# Patient Record
Sex: FEMALE | Race: WHITE | Employment: UNEMPLOYED | URBAN - METROPOLITAN AREA
[De-identification: names, ages, dates, MRNs, and addresses within clinical notes are randomized per-mention and may not be internally consistent; named-entity substitution may affect disease eponyms.]

---

## 2017-05-26 ENCOUNTER — TELEPHONE (OUTPATIENT)
Dept: SURGERY | Age: 22
End: 2017-05-26

## 2018-09-20 ENCOUNTER — HOSPITAL ENCOUNTER (EMERGENCY)
Age: 23
Discharge: HOME | End: 2018-09-20
Attending: EMERGENCY MEDICINE
Payer: MEDICAID

## 2018-09-20 VITALS
DIASTOLIC BLOOD PRESSURE: 74 MMHG | OXYGEN SATURATION: 100 % | SYSTOLIC BLOOD PRESSURE: 112 MMHG | TEMPERATURE: 98.8 F | HEART RATE: 110 BPM | RESPIRATION RATE: 16 BRPM | WEIGHT: 120 LBS | HEIGHT: 62 IN | BODY MASS INDEX: 22.08 KG/M2

## 2018-09-20 DIAGNOSIS — L02.91 ABSCESS: Primary | ICD-10-CM

## 2018-09-20 PROCEDURE — 99283 EMERGENCY DEPT VISIT LOW MDM: CPT

## 2018-09-20 PROCEDURE — 4500000023 HC ED LEVEL 3 PROCEDURE

## 2018-09-20 PROCEDURE — 6360000002 HC RX W HCPCS: Performed by: EMERGENCY MEDICINE

## 2018-09-20 PROCEDURE — 2580000003 HC RX 258: Performed by: EMERGENCY MEDICINE

## 2018-09-20 PROCEDURE — 96365 THER/PROPH/DIAG IV INF INIT: CPT

## 2018-09-20 PROCEDURE — 6370000000 HC RX 637 (ALT 250 FOR IP): Performed by: EMERGENCY MEDICINE

## 2018-09-20 RX ORDER — ONDANSETRON 4 MG/1
4 TABLET, ORALLY DISINTEGRATING ORAL ONCE
Status: COMPLETED | OUTPATIENT
Start: 2018-09-20 | End: 2018-09-20

## 2018-09-20 RX ORDER — OXYCODONE HYDROCHLORIDE AND ACETAMINOPHEN 5; 325 MG/1; MG/1
1 TABLET ORAL EVERY 6 HOURS PRN
Qty: 12 TABLET | Refills: 0 | Status: SHIPPED | OUTPATIENT
Start: 2018-09-20 | End: 2018-09-23

## 2018-09-20 RX ORDER — SULFAMETHOXAZOLE AND TRIMETHOPRIM 800; 160 MG/1; MG/1
1 TABLET ORAL 2 TIMES DAILY
Qty: 14 TABLET | Refills: 0 | Status: SHIPPED | OUTPATIENT
Start: 2018-09-20 | End: 2018-09-27

## 2018-09-20 RX ORDER — OXYCODONE HYDROCHLORIDE AND ACETAMINOPHEN 5; 325 MG/1; MG/1
2 TABLET ORAL ONCE
Status: COMPLETED | OUTPATIENT
Start: 2018-09-20 | End: 2018-09-20

## 2018-09-20 RX ADMIN — ONDANSETRON 4 MG: 4 TABLET, ORALLY DISINTEGRATING ORAL at 22:30

## 2018-09-20 RX ADMIN — VANCOMYCIN HYDROCHLORIDE 1000 MG: 1 INJECTION, POWDER, LYOPHILIZED, FOR SOLUTION INTRAVENOUS at 21:51

## 2018-09-20 RX ADMIN — OXYCODONE AND ACETAMINOPHEN 2 TABLET: 5; 325 TABLET ORAL at 22:30

## 2018-09-20 ASSESSMENT — PAIN SCALES - GENERAL: PAINLEVEL_OUTOF10: 7

## 2018-09-21 NOTE — ED NOTES
IV removed with cannula intact. Discharged home. Denies questions. Instructed to f/u states understanding.       Jareth Dunn RN  09/20/18 1978

## 2018-09-21 NOTE — ED PROVIDER NOTES
Triage Chief Complaint:   Abscess (above right eye states ongoing for a few days. States she \"popped it today and a bunch of pus came out\". Area red and swollen tender to touch)      [place]:  Edson Mack is a 24 y.o. female that presents with an abscess above her right. Patient states that she initially had a pimple which she tried to pop and did drain some pus at the time. Now has become progressively more swollen and she has some surrounding cellulitis as well she has not had a fever is not vomiting and is otherwise healthy. She is not known to be immunocompromised. ROS:  Review of systems was reviewed for 10 systems and is otherwise negative except as in the [address]    History reviewed. No pertinent past medical history. Past Surgical History:   Procedure Laterality Date     SECTION       History reviewed. No pertinent family history. Social History     Social History    Marital status: Single     Spouse name: N/A    Number of children: N/A    Years of education: N/A     Occupational History    Not on file.      Social History Main Topics    Smoking status: Current Every Day Smoker     Packs/day: 0.50     Types: Cigarettes    Smokeless tobacco: Never Used    Alcohol use No    Drug use: Yes     Types: Methamphetamines, Marijuana    Sexual activity: Not on file     Other Topics Concern    Not on file     Social History Narrative    No narrative on file     Current Facility-Administered Medications   Medication Dose Route Frequency Provider Last Rate Last Dose    vancomycin 1000 mg IVPB in 250 mL D5W addavial  1,000 mg Intravenous Once Lui Little  mL/hr at 18 2151 1,000 mg at 18 2151    oxyCODONE-acetaminophen (PERCOCET) 5-325 MG per tablet 2 tablet  2 tablet Oral Once Lui Little MD        ondansetron (ZOFRAN-ODT) disintegrating tablet 4 mg  4 mg Oral Once Lui Little MD         Current Outpatient Prescriptions   Medication Sig Dispense Refill    oxyCODONE-acetaminophen (PERCOCET) 5-325 MG per tablet Take 1 tablet by mouth every 6 hours as needed for Pain for up to 3 days. Intended supply: 3 days. Take lowest dose possible to manage pain. 12 tablet 0    sulfamethoxazole-trimethoprim (BACTRIM DS) 800-160 MG per tablet Take 1 tablet by mouth 2 times daily for 7 days 14 tablet 0     Allergies   Allergen Reactions    Ultram [Tramadol]     Vicodin [Hydrocodone-Acetaminophen]      Nursing Notes Reviewed    Physical Exam:  ED Triage Vitals [09/20/18 2107]   Enc Vitals Group      /74      Pulse 110      Resp 16      Temp 98.8 °F (37.1 °C)      Temp src       SpO2 100 %      Weight 120 lb (54.4 kg)      Height 5' 2\" (1.575 m)      Head Circumference       Peak Flow       Pain Score       Pain Loc       Pain Edu? Excl. in [address]? GENERAL APPEARANCE: A well-developed well-nourished very pleasant uncomfortable 67-year-old female in mild distress   HEAD: Normocephalic, atraumatic  EYES: Sclera anicteric.no conjunctival injection, right eyebrow exhibits a cellulitis with an abscess and erythema noted over the right eyelid as well. There was no mili globe involvement. No hypopyon anterior chambers deep no conjunctival injection she did have preauricular adenopathy  ENT: Mucous membranes moist, no nasal discharge,   NECK: Supple, no meningismus,   MENTAL STATUS: Alert, oriented, interactive,     Nursing note and vital signs reviewed     I have reviewed and interpreted all of the currently available lab results from this visit (if applicable):  No results found for this visit on 09/20/18.      Radiographs (if obtained):  [] Radiologist's Report Reviewed:  No orders to display       [] Discussed with Radiologist:     [] The following radiograph was interpreted by myself in the absence of a radiologist:     EKG (if obtained): (All EKG's are interpreted by myself in the absence of a cardiologist)    Procedure note: Patient's abscess was

## 2019-12-20 RX ORDER — BUPROPION HYDROCHLORIDE 300 MG/1
TABLET ORAL
Qty: 30 TABLET | OUTPATIENT
Start: 2019-12-20

## 2020-03-10 ENCOUNTER — HOSPITAL ENCOUNTER (EMERGENCY)
Age: 25
Discharge: HOME | End: 2020-03-10
Payer: MEDICAID

## 2020-03-10 VITALS
DIASTOLIC BLOOD PRESSURE: 76 MMHG | SYSTOLIC BLOOD PRESSURE: 120 MMHG | HEART RATE: 80 BPM | BODY MASS INDEX: 21.95 KG/M2 | WEIGHT: 120 LBS | RESPIRATION RATE: 18 BRPM | OXYGEN SATURATION: 100 % | TEMPERATURE: 97.9 F

## 2020-03-10 PROCEDURE — 6370000000 HC RX 637 (ALT 250 FOR IP): Performed by: PHYSICIAN ASSISTANT

## 2020-03-10 PROCEDURE — 2580000003 HC RX 258: Performed by: PHYSICIAN ASSISTANT

## 2020-03-10 PROCEDURE — 96376 TX/PRO/DX INJ SAME DRUG ADON: CPT

## 2020-03-10 PROCEDURE — 6360000002 HC RX W HCPCS: Performed by: PHYSICIAN ASSISTANT

## 2020-03-10 PROCEDURE — 99284 EMERGENCY DEPT VISIT MOD MDM: CPT

## 2020-03-10 PROCEDURE — 96374 THER/PROPH/DIAG INJ IV PUSH: CPT

## 2020-03-10 RX ORDER — 0.9 % SODIUM CHLORIDE 0.9 %
1000 INTRAVENOUS SOLUTION INTRAVENOUS ONCE
Status: COMPLETED | OUTPATIENT
Start: 2020-03-10 | End: 2020-03-10

## 2020-03-10 RX ORDER — ACETAMINOPHEN 325 MG/1
650 TABLET ORAL ONCE
Status: COMPLETED | OUTPATIENT
Start: 2020-03-10 | End: 2020-03-10

## 2020-03-10 RX ORDER — ONDANSETRON 2 MG/ML
4 INJECTION INTRAMUSCULAR; INTRAVENOUS ONCE
Status: COMPLETED | OUTPATIENT
Start: 2020-03-10 | End: 2020-03-10

## 2020-03-10 RX ADMIN — ACETAMINOPHEN 650 MG: 325 TABLET ORAL at 19:16

## 2020-03-10 RX ADMIN — SODIUM CHLORIDE 1000 ML: 9 INJECTION, SOLUTION INTRAVENOUS at 19:16

## 2020-03-10 RX ADMIN — ONDANSETRON 4 MG: 2 INJECTION INTRAMUSCULAR; INTRAVENOUS at 20:08

## 2020-03-10 RX ADMIN — ONDANSETRON 4 MG: 2 INJECTION INTRAMUSCULAR; INTRAVENOUS at 19:16

## 2020-03-10 ASSESSMENT — PAIN SCALES - GENERAL
PAINLEVEL_OUTOF10: 6
PAINLEVEL_OUTOF10: 5
PAINLEVEL_OUTOF10: 9
PAINLEVEL_OUTOF10: 9

## 2020-03-10 ASSESSMENT — PAIN DESCRIPTION - LOCATION
LOCATION: ABDOMEN;HEAD
LOCATION: HEAD

## 2020-03-10 ASSESSMENT — PAIN DESCRIPTION - PAIN TYPE
TYPE: ACUTE PAIN

## 2020-03-10 NOTE — ED PROVIDER NOTES
[facility] Emergency Department    CHIEF COMPLAINT  Drug Overdose (overdosed on heroin tonight, neighbor called EMS, pt unconsious on EMS arrival given 2 of narcan )      Nick Bynum is a 21 y.o. female who presents to the ED complaining of heroin overdose. Patient brought in today for evaluation. Patient with history of heroin use. States that she has been clean for 9 months. Used again for first time today. Believes that she was found by neighbor. The squad reports that neighbor did call for unresponsive individual.  They did give 2 mg of Narcan and patient responsive. She states that she does have a headache and feels nauseous. No chest pain shortness of breath. No abdominal pain. No urinary symptoms. No fevers chills. No other complaints, modifying factors or associated symptoms. Nursing notes reviewed. History reviewed. No pertinent past medical history. Past Surgical History:   Procedure Laterality Date     SECTION       History reviewed. No pertinent family history.   Social History     Socioeconomic History    Marital status: Single     Spouse name: Not on file    Number of children: Not on file    Years of education: Not on file    Highest education level: Not on file   Occupational History    Not on file   Social Needs    Financial resource strain: Not on file    Food insecurity     Worry: Not on file     Inability: Not on file    Transportation needs     Medical: Not on file     Non-medical: Not on file   Tobacco Use    Smoking status: Current Every Day Smoker     Packs/day: 0.50     Types: Cigarettes    Smokeless tobacco: Never Used   Substance and Sexual Activity    Alcohol use: No    Drug use: Yes     Types: Methamphetamines, Marijuana    Sexual activity: Not on file   Lifestyle    Physical activity     Days per week: Not on file     Minutes per session: Not on file    Stress: Not on file extremities equally. All extremities neurovascularly intact. SKIN: Warm and dry. No acute rashes. NEUROLOGICAL: Alert and oriented. CN's 2-12 intact. No gross facial drooping. Strength 5/5, sensation intact. PSYCHIATRIC: Normal mood and affect. ED COURSE   I have evaluated this patient. Attending physician was available for consultation. Patient received Zofran and Tylenol and nausea for pain , with good relief. Triage vital stable. Given 1 L IV fluid bolus. Patient reports nausea improved although did still have some waves of nausea. No active vomiting. Given additional dose of Zofran. Tolerated p.o. challenge. Observed here for some time without recurrent unresponsiveness. Patient does at this time feel comfortable with discharge home. Given a Narcan to go kit. Have discussed return precautions as well as recommendations for follow-up otherwise and patient in agreement and comfortable discharge. .  A discussion was had with Ms. Tomás Younger regarding heroin overdose, ED findings and recommendations for follow-up. All questions were answered. Patient will follow up with PCP within 2 to 3 days for further evaluation/treatment. Patient will return to ED for new/worsening symptoms. Patient was sent home with a prescription for Narcan. MDM  I estimate there is LOW risk for ACUTE CORONARY SYNDROME, INTRACRANIAL HEMORRHAGE, MALIGNANT DYSRHYTHMIA, MENINGITIS, PNEUMONIA, PULMONARY EMBOLISM, SEPSIS, SUBARACHNOID HEMORRHAGE, SUBDURAL HEMATOMA, STROKE, or URINARY TRACT INFECTION, thus I consider the discharge disposition reasonable. Lynsey Martino and I have discussed the diagnosis and risks, and we agree with discharging home to follow-up with their primary doctor. We also discussed returning to the Emergency Department immediately if new or worsening symptoms occur.  We have discussed the symptoms which are most concerning (e.g., changing or worsening pain, weakness, vomiting, fever) that necessitate immediate return. Final Impression  1. Accidental overdose of heroin, initial encounter ([facility] [address]      Blood pressure 120/76, pulse 80, temperature 97.9 °F (36.6 °C), temperature source Axillary, resp. rate 18, weight 120 lb (54.4 kg), last menstrual period 02/10/2020, SpO2 100 %. DISPOSITION  Patient was discharged to home in good condition.           Manjustin Alas, [address]  03/12/20 9137

## 2020-03-11 NOTE — ED NOTES
Pt ambulated to bathroom with this RN. Pt stated she felt dizzy and shaky. Pt gait was slightly unsteady.       Al Gaytan RN  03/10/20 4911

## 2020-03-11 NOTE — ED NOTES
Pt ambulated again with this RN. Pt gait steady and without assistance.       Charleen Vidal RN  03/10/20 4147

## 2020-03-11 NOTE — ED NOTES
--Patient provided with discharge instructions and any prescriptions. --Instructions, dosing, and follow-up appointments reviewed with patient/family. No further questions or needs at this time. --Vital signs and patient stable upon discharge.   --Patient ambulatory to [facility] with significant other     Pierre Aldrich RN  03/10/20 0830

## 2023-03-09 ENCOUNTER — HOSPITAL ENCOUNTER (EMERGENCY)
Age: 28
Discharge: HOME | End: 2023-03-09
Attending: EMERGENCY MEDICINE
Payer: COMMERCIAL

## 2023-03-09 ENCOUNTER — APPOINTMENT (OUTPATIENT)
Dept: GENERAL RADIOLOGY | Age: 28
End: 2023-03-09
Payer: COMMERCIAL

## 2023-03-09 VITALS
DIASTOLIC BLOOD PRESSURE: 76 MMHG | OXYGEN SATURATION: 99 % | BODY MASS INDEX: 22.49 KG/M2 | SYSTOLIC BLOOD PRESSURE: 114 MMHG | WEIGHT: 135 LBS | TEMPERATURE: 98.2 F | HEART RATE: 89 BPM | RESPIRATION RATE: 16 BRPM | HEIGHT: 65 IN

## 2023-03-09 DIAGNOSIS — R10.10 PAIN OF UPPER ABDOMEN: ICD-10-CM

## 2023-03-09 DIAGNOSIS — R74.01 TRANSAMINITIS: Primary | ICD-10-CM

## 2023-03-09 LAB
A/G RATIO: 1.4 (ref 1.1–2.2)
ALBUMIN SERPL-MCNC: 4.5 G/DL (ref 3.4–5)
ALP BLD-CCNC: 103 U/L (ref 40–129)
ALT SERPL-CCNC: 78 U/L (ref 10–40)
ANION GAP SERPL CALCULATED.3IONS-SCNC: 7 MMOL/L (ref 3–16)
AST SERPL-CCNC: 79 U/L (ref 15–37)
BASOPHILS ABSOLUTE: 0.1 K/UL (ref 0–0.2)
BASOPHILS RELATIVE PERCENT: 0.8 %
BILIRUB SERPL-MCNC: <0.2 MG/DL (ref 0–1)
BILIRUBIN URINE: NEGATIVE
BLOOD, URINE: NEGATIVE
BUN BLDV-MCNC: 15 MG/DL (ref 7–20)
CALCIUM SERPL-MCNC: 9.7 MG/DL (ref 8.3–10.6)
CHLORIDE BLD-SCNC: 104 MMOL/L (ref 99–110)
CLARITY: CLEAR
CO2: 30 MMOL/L (ref 21–32)
COLOR: YELLOW
CREAT SERPL-MCNC: 0.9 MG/DL (ref 0.6–1.1)
EOSINOPHILS ABSOLUTE: 0.1 K/UL (ref 0–0.6)
EOSINOPHILS RELATIVE PERCENT: 1.5 %
GFR SERPL CREATININE-BSD FRML MDRD: >60 ML/MIN/{1.73_M2}
GLUCOSE BLD-MCNC: 81 MG/DL (ref 70–99)
GLUCOSE URINE: NEGATIVE MG/DL
HCG(URINE) PREGNANCY TEST: NEGATIVE
HCT VFR BLD CALC: 33.5 % (ref 36–48)
HEMOGLOBIN: 10.7 G/DL (ref 12–16)
KETONES, URINE: NEGATIVE MG/DL
LEUKOCYTE ESTERASE, URINE: NEGATIVE
LIPASE: 40 U/L (ref 13–60)
LYMPHOCYTES ABSOLUTE: 2.1 K/UL (ref 1–5.1)
LYMPHOCYTES RELATIVE PERCENT: 28.9 %
MCH RBC QN AUTO: 22.4 PG (ref 26–34)
MCHC RBC AUTO-ENTMCNC: 31.9 G/DL (ref 31–36)
MCV RBC AUTO: 70.2 FL (ref 80–100)
MICROSCOPIC EXAMINATION: NORMAL
MONOCYTES ABSOLUTE: 1.1 K/UL (ref 0–1.3)
MONOCYTES RELATIVE PERCENT: 14.7 %
NEUTROPHILS ABSOLUTE: 3.9 K/UL (ref 1.7–7.7)
NEUTROPHILS RELATIVE PERCENT: 54.1 %
NITRITE, URINE: NEGATIVE
PDW BLD-RTO: 18.2 % (ref 12.4–15.4)
PH UA: 7 (ref 5–8)
PLATELET # BLD: 253 K/UL (ref 135–450)
PMV BLD AUTO: 8.1 FL (ref 5–10.5)
POTASSIUM REFLEX MAGNESIUM: 4.1 MMOL/L (ref 3.5–5.1)
PROTEIN UA: NEGATIVE MG/DL
RBC # BLD: 4.77 M/UL (ref 4–5.2)
SODIUM BLD-SCNC: 141 MMOL/L (ref 136–145)
SPECIFIC GRAVITY UA: 1.01 (ref 1–1.03)
TOTAL PROTEIN: 7.8 G/DL (ref 6.4–8.2)
URINE REFLEX TO CULTURE: NORMAL
URINE TYPE: NORMAL
UROBILINOGEN, URINE: 0.2 E.U./DL
WBC # BLD: 7.3 K/UL (ref 4–11)

## 2023-03-09 PROCEDURE — 99284 EMERGENCY DEPT VISIT MOD MDM: CPT

## 2023-03-09 PROCEDURE — 84703 CHORIONIC GONADOTROPIN ASSAY: CPT

## 2023-03-09 PROCEDURE — 83690 ASSAY OF LIPASE: CPT

## 2023-03-09 PROCEDURE — 71045 X-RAY EXAM CHEST 1 VIEW: CPT

## 2023-03-09 PROCEDURE — 36415 COLL VENOUS BLD VENIPUNCTURE: CPT

## 2023-03-09 PROCEDURE — 80053 COMPREHEN METABOLIC PANEL: CPT

## 2023-03-09 PROCEDURE — 85025 COMPLETE CBC W/AUTO DIFF WBC: CPT

## 2023-03-09 PROCEDURE — 81003 URINALYSIS AUTO W/O SCOPE: CPT

## 2023-03-09 ASSESSMENT — PAIN DESCRIPTION - DESCRIPTORS: DESCRIPTORS: STABBING

## 2023-03-09 ASSESSMENT — PAIN DESCRIPTION - ORIENTATION: ORIENTATION: RIGHT;UPPER

## 2023-03-09 ASSESSMENT — PAIN - FUNCTIONAL ASSESSMENT
PAIN_FUNCTIONAL_ASSESSMENT: NONE - DENIES PAIN
PAIN_FUNCTIONAL_ASSESSMENT: 0-10

## 2023-03-09 ASSESSMENT — PAIN DESCRIPTION - LOCATION: LOCATION: ABDOMEN

## 2023-03-09 ASSESSMENT — PAIN SCALES - GENERAL: PAINLEVEL_OUTOF10: 6

## 2023-03-10 NOTE — DISCHARGE INSTRUCTIONS
You were seen for 1 month of abdominal pain. Your liver function tests are abnormally high. Your bilirubin is normal.  Unclear cause. We recommend that you follow-up with a GI specialist.  Call and arrange follow-up appointment.   Return to emergency department with any new or emergent concerns

## 2023-03-10 NOTE — ED PROVIDER NOTES
Emergency Department Provider Note  [facility]  3/9/2023     Patient Identification  Konrad Godinez is a 28 y.o. female    Chief Complaint  Abdominal Pain (C/O RUQ pain x 1 month)          HPI  (History provided by patient)  Patient is a 68-year-old female status post cholecystectomy who presents with epigastric and right upper quadrant pain for the past month. Fairly persistent and has been near constant for the past 4 to 5 days. No fevers or chills no nausea no vomiting passing stool passing flatus no diarrhea. No urinary symptoms. No exertional symptoms no chest pain or shortness of breath. I have reviewed the following nursing documentation:  Allergies: Allergies   Allergen Reactions    Ultram [Tramadol]     Vicodin [Hydrocodone-Acetaminophen]        Past medical history:  has no past medical history on file. Past surgical history:  has a past surgical history that includes  section. Home medications:   Prior to Admission medications    Not on File       Social history:  reports that she has been smoking cigarettes. She has been smoking an average of .5 packs per day. She has never used smokeless tobacco. She reports current drug use. Drugs: Methamphetamines (Crystal Meth) and Marijuana (Maryhelen Burows). She reports that she does not drink alcohol. Family history:  History reviewed. No pertinent family history. Exam  ED Triage Vitals [23]   BP Temp Temp Source Heart Rate Resp SpO2 Height Weight   114/76 98.2 °F (36.8 °C) Oral 89 16 99 % 5' 5\" (1.651 m) 135 lb (61.2 kg)       Physical Exam  Vitals and nursing note reviewed. Constitutional:       General: She is not in acute distress. Appearance: She is well-developed. HENT:      Head: Normocephalic and atraumatic. Nose: Nose normal. No congestion. Eyes:      General: No scleral icterus. Extraocular Movements: Extraocular movements intact.       Pupils: Pupils are equal, round, and reactive to light. Cardiovascular:      Rate and Rhythm: Normal rate and regular rhythm. Heart sounds: No murmur heard. Pulmonary:      Effort: Pulmonary effort is normal.      Breath sounds: Normal breath sounds. Abdominal:      General: There is no distension. Palpations: Abdomen is soft. Tenderness: There is no abdominal tenderness. There is no guarding. Musculoskeletal:         General: No deformity. Normal range of motion. Cervical back: Normal range of motion and neck supple. Skin:     General: Skin is warm. Findings: No rash. Neurological:      Mental Status: She is alert and oriented to person, place, and time. Motor: No abnormal muscle tone. Coordination: Coordination normal.   Psychiatric:         Mood and Affect: Mood normal.         Behavior: Behavior normal.         MDM/ED Course    ED Medication Orders (From admission, onward)      None              Radiology  XR CHEST PORTABLE    Result Date: 3/9/2023  EXAMINATION: ONE XRAY VIEW OF THE CHEST 3/9/2023 9:41 pm COMPARISON: None. HISTORY: ORDERING SYSTEM PROVIDED HISTORY: RUQ pains TECHNOLOGIST PROVIDED HISTORY: Reason for exam:->RUQ pains Reason for Exam: RUQ pains FINDINGS: Heart size and configuration are normal.  Hilar and mediastinal structures are unremarkable. There are right paratracheal calcifications from remote granulomatous disease. The lungs are clear. No pneumothorax or pleural fluid. No acute bone finding. No acute cardiopulmonary disease. Bedside Ultrasound  No results found.        Labs  Results for orders placed or performed during the hospital encounter of 03/09/23   CBC with Auto Differential   Result Value Ref Range    WBC 7.3 4.0 - 11.0 K/uL    RBC 4.77 4.00 - 5.20 M/uL    Hemoglobin 10.7 (L) 12.0 - 16.0 g/dL    Hematocrit 33.5 (L) 36.0 - 48.0 %    MCV 70.2 (L) 80.0 - 100.0 fL    MCH 22.4 (L) 26.0 - 34.0 pg    MCHC 31.9 31.0 - 36.0 g/dL    RDW 18.2 (H) 12.4 - 15.4 % Platelets 285 397 - 894 K/uL    MPV 8.1 5.0 - 10.5 fL    Neutrophils % 54.1 %    Lymphocytes % 28.9 %    Monocytes % 14.7 %    Eosinophils % 1.5 %    Basophils % 0.8 %    Neutrophils Absolute 3.9 1.7 - 7.7 K/uL    Lymphocytes Absolute 2.1 1.0 - 5.1 K/uL    Monocytes Absolute 1.1 0.0 - 1.3 K/uL    Eosinophils Absolute 0.1 0.0 - 0.6 K/uL    Basophils Absolute 0.1 0.0 - 0.2 K/uL   CMP w/ Reflex to MG   Result Value Ref Range    Sodium 141 136 - 145 mmol/L    Potassium reflex Magnesium 4.1 3.5 - 5.1 mmol/L    Chloride 104 99 - 110 mmol/L    CO2 30 21 - 32 mmol/L    Anion Gap 7 3 - 16    Glucose 81 70 - 99 mg/dL    BUN 15 7 - 20 mg/dL    Creatinine 0.9 0.6 - 1.1 mg/dL    Est, Glom Filt Rate >60 >60    Calcium 9.7 8.3 - 10.6 mg/dL    Total Protein 7.8 6.4 - 8.2 g/dL    Albumin 4.5 3.4 - 5.0 g/dL    Albumin/Globulin Ratio 1.4 1.1 - 2.2    Total Bilirubin <0.2 0.0 - 1.0 mg/dL    Alkaline Phosphatase 103 40 - 129 U/L    ALT 78 (H) 10 - 40 U/L    AST 79 (H) 15 - 37 U/L   Lipase   Result Value Ref Range    Lipase 40.0 13.0 - 60.0 U/L   Urinalysis with Reflex to Culture    Specimen: Urine   Result Value Ref Range    Color, UA Yellow Straw/Yellow    Clarity, UA Clear Clear    Glucose, Ur Negative Negative mg/dL    Bilirubin Urine Negative Negative    Ketones, Urine Negative Negative mg/dL    Specific Gravity, UA 1.015 1.005 - 1.030    Blood, Urine Negative Negative    pH, UA 7.0 5.0 - 8.0    Protein, UA Negative Negative mg/dL    Urobilinogen, Urine 0.2 <2.0 E.U./dL    Nitrite, Urine Negative Negative    Leukocyte Esterase, Urine Negative Negative    Microscopic Examination Not Indicated     Urine Type NotGiven     Urine Reflex to Culture Not Indicated    Urine Preg (Lab)   Result Value Ref Range    HCG(Urine) Pregnancy Test Negative Detects HCG level >20 MIU/mL         Procedures  Procedures      Patient seen and evaluated. Relevant records reviewed.   - Patient is 28 y.o. female presented for subacute epigastric and right upper quadrant abdominal pains in setting or chronic conditions current smoker.  - Exam showed well-appearing female no acute distress her vitals are reassuring. She has no significant tenderness on palpation of the abdomen. Not concern for any surgical issue. Her symptoms are not consistent with any cardiopulmonary process. - Patient was placed on telemetry during his/her ED stay and no malignant dysrhythmia observed. - Diagnostic studies reviewed. CMP shows transaminitis in the 70s, normal bilirubin. Not indicative of any cholestasis and if her symptoms have been going on for a month I would expect the bilirubin to be elevated if it were of this etiology. CBC does not reveal any significant derangements. I considered imaging of the abdomen but did not feel that is indicated as I have low concern for any surgical process choledocholithiasis or other emergent pathology or imaging would . Do feel she should follow-up with GI physician for further work-up. -At the time of discharge the patient is now reporting that she has hepatitis C. Hepatitis would certainly explain her symptoms and lab test.  Do not feel that this change to the plan to refer her to GI.  - At this point I do not see indication for further work-up in the ER, as it is unlikely  and poses more risk than benefit. - Return precautions also discussed. Patient/family verbalized understanding of care plan and agreeable to plan. Clinical Impression:  1. Transaminitis    2. Pain of upper abdomen          Disposition:  Discharge to home in good condition. Blood pressure 114/76, pulse 89, temperature 98.2 °F (36.8 °C), temperature source Oral, resp. rate 16, height 5' 5\" (1.651 m), weight 135 lb (61.2 kg), last menstrual period 02/20/2023, SpO2 99 %. Patient was given scripts for the following medications. I counseled patient how to take these medications.    New Prescriptions    No medications on file     Modified Medications    No medications on file       Disposition referral (if applicable):  Waylon Vides, [facility], [address] [place] [address]    Schedule an appointment as soon as possible for a visit       ICruz, am the primary attending of record and contributed the majority of evaluation and treatment of emergent care for this encounter. Total critical care time is 0 minutes, which excludes separately billable procedures and updating family. Time spent is specifically for management of the presenting complaint and symptoms initially, direct bedside care, reevaluation, review of records, and consultation. There was a high probability of clinically significant life-threatening deterioration in the patient's condition, which required my urgent intervention. This chart was generated in part by using Dragon Dictation system and may contain errors related to that system including errors in grammar, punctuation, and spelling, as well as words and phrases that may be inappropriate. If there are any questions or concerns please feel free to contact the dictating provider for clarification.      Aishwarya Pinon MD  [address], MD  03/09/23 2224       Aishwarya Pinon MD  03/09/23 8789

## 2023-05-17 ENCOUNTER — HOSPITAL ENCOUNTER (OUTPATIENT)
Age: 28
Discharge: HOME | End: 2023-05-17
Payer: COMMERCIAL

## 2023-05-17 PROCEDURE — 87522 HEPATITIS C REVRS TRNSCRPJ: CPT

## 2023-05-17 PROCEDURE — 83013 H PYLORI (C-13) BREATH: CPT

## 2023-05-17 PROCEDURE — 80053 COMPREHEN METABOLIC PANEL: CPT

## 2023-05-17 PROCEDURE — 82784 ASSAY IGA/IGD/IGG/IGM EACH: CPT

## 2023-05-17 PROCEDURE — 80074 ACUTE HEPATITIS PANEL: CPT

## 2023-05-17 PROCEDURE — 83690 ASSAY OF LIPASE: CPT

## 2023-05-17 PROCEDURE — 85025 COMPLETE CBC W/AUTO DIFF WBC: CPT

## 2023-05-17 PROCEDURE — 82105 ALPHA-FETOPROTEIN SERUM: CPT

## 2023-05-17 PROCEDURE — 83516 IMMUNOASSAY NONANTIBODY: CPT

## 2023-05-18 LAB
ALBUMIN SERPL-MCNC: 4.9 G/DL (ref 3.4–5)
ALBUMIN/GLOB SERPL: 1.2 {RATIO} (ref 1.1–2.2)
ALP SERPL-CCNC: 85 U/L (ref 40–129)
ALT SERPL-CCNC: 39 U/L (ref 10–40)
ANION GAP SERPL CALCULATED.3IONS-SCNC: 8 MMOL/L (ref 3–16)
AST SERPL-CCNC: 33 U/L (ref 15–37)
BASOPHILS # BLD: 0 K/UL (ref 0–0.2)
BASOPHILS NFR BLD: 0.6 %
BILIRUB SERPL-MCNC: <0.2 MG/DL (ref 0–1)
BUN SERPL-MCNC: 11 MG/DL (ref 7–20)
CALCIUM SERPL-MCNC: 9.9 MG/DL (ref 8.3–10.6)
CHLORIDE SERPL-SCNC: 101 MMOL/L (ref 99–110)
CO2 SERPL-SCNC: 27 MMOL/L (ref 21–32)
CREAT SERPL-MCNC: 0.7 MG/DL (ref 0.6–1.1)
DEPRECATED RDW RBC AUTO: 19.9 % (ref 12.4–15.4)
EOSINOPHIL # BLD: 0.1 K/UL (ref 0–0.6)
EOSINOPHIL NFR BLD: 1.9 %
GFR SERPLBLD CREATININE-BSD FMLA CKD-EPI: >60 ML/MIN/{1.73_M2}
GLUCOSE SERPL-MCNC: 87 MG/DL (ref 70–99)
HCT VFR BLD AUTO: 38.4 % (ref 36–48)
HGB BLD-MCNC: 11.9 G/DL (ref 12–16)
IGA SERPL-MCNC: 171 MG/DL (ref 70–400)
LIPASE SERPL-CCNC: 55 U/L (ref 13–60)
LYMPHOCYTES # BLD: 1.8 K/UL (ref 1–5.1)
LYMPHOCYTES NFR BLD: 26.2 %
MCH RBC QN AUTO: 22.4 PG (ref 26–34)
MCHC RBC AUTO-ENTMCNC: 31.1 G/DL (ref 31–36)
MCV RBC AUTO: 71.9 FL (ref 80–100)
MONOCYTES # BLD: 0.6 K/UL (ref 0–1.3)
MONOCYTES NFR BLD: 8.6 %
NEUTROPHILS # BLD: 4.2 K/UL (ref 1.7–7.7)
NEUTROPHILS NFR BLD: 62.7 %
PLATELET # BLD AUTO: 275 K/UL (ref 135–450)
PMV BLD AUTO: 9.8 FL (ref 5–10.5)
POTASSIUM SERPL-SCNC: 3.9 MMOL/L (ref 3.5–5.1)
PROT SERPL-MCNC: 8.9 G/DL (ref 6.4–8.2)
RBC # BLD AUTO: 5.34 M/UL (ref 4–5.2)
SODIUM SERPL-SCNC: 136 MMOL/L (ref 136–145)
WBC # BLD AUTO: 6.8 K/UL (ref 4–11)

## 2023-05-19 LAB — TISSUE TRANSGLUTAMINASE IGA: <0.5 U/ML (ref 0–14)

## 2023-05-20 LAB
HAV IGM SERPL QL IA: ABNORMAL
HBV CORE IGM SERPL QL IA: ABNORMAL
HBV SURFACE AG SERPL QL IA: ABNORMAL
HCV AB SERPL QL IA: REACTIVE
HCV RNA SERPL NAA+PROBE-ACNC: ABNORMAL IU/ML
HCV RNA SERPL NAA+PROBE-LOG IU: 5.41 LOG IU/ML
HCV RNA SERPL QL NAA+PROBE: DETECTED

## 2023-05-23 LAB
AFP-TM SERPL-MCNC: 2 UG/L
HCV GENTYP SERPL NAA+PROBE: NORMAL

## 2023-05-31 ENCOUNTER — HOSPITAL ENCOUNTER (OUTPATIENT)
Dept: ULTRASOUND IMAGING | Age: 28
Discharge: HOME | End: 2023-05-31
Attending: INTERNAL MEDICINE
Payer: COMMERCIAL

## 2023-05-31 DIAGNOSIS — B18.2 CHRONIC HEPATITIS C WITHOUT HEPATIC COMA (HCC): ICD-10-CM

## 2023-05-31 PROCEDURE — 76981 USE PARENCHYMA: CPT

## 2023-05-31 PROCEDURE — 76705 ECHO EXAM OF ABDOMEN: CPT

## 2023-08-10 ENCOUNTER — HOSPITAL ENCOUNTER (EMERGENCY)
Age: 28
Discharge: HOME | End: 2023-08-10
Attending: EMERGENCY MEDICINE
Payer: COMMERCIAL

## 2023-08-10 VITALS
BODY MASS INDEX: 26.66 KG/M2 | TEMPERATURE: 97.9 F | WEIGHT: 160 LBS | OXYGEN SATURATION: 98 % | HEIGHT: 65 IN | SYSTOLIC BLOOD PRESSURE: 138 MMHG | RESPIRATION RATE: 16 BRPM | HEART RATE: 90 BPM | DIASTOLIC BLOOD PRESSURE: 92 MMHG

## 2023-08-10 DIAGNOSIS — L02.411 ABSCESS OF RIGHT AXILLA: Primary | ICD-10-CM

## 2023-08-10 PROCEDURE — 6370000000 HC RX 637 (ALT 250 FOR IP): Performed by: EMERGENCY MEDICINE

## 2023-08-10 PROCEDURE — 10060 I&D ABSCESS SIMPLE/SINGLE: CPT

## 2023-08-10 PROCEDURE — 99283 EMERGENCY DEPT VISIT LOW MDM: CPT

## 2023-08-10 RX ORDER — LORAZEPAM 1 MG/1
2 TABLET ORAL ONCE
Status: COMPLETED | OUTPATIENT
Start: 2023-08-10 | End: 2023-08-10

## 2023-08-10 RX ORDER — SULFAMETHOXAZOLE AND TRIMETHOPRIM 800; 160 MG/1; MG/1
1 TABLET ORAL ONCE
Status: COMPLETED | OUTPATIENT
Start: 2023-08-10 | End: 2023-08-10

## 2023-08-10 RX ORDER — SULFAMETHOXAZOLE AND TRIMETHOPRIM 800; 160 MG/1; MG/1
1 TABLET ORAL 2 TIMES DAILY
Qty: 14 TABLET | Refills: 0 | Status: SHIPPED | OUTPATIENT
Start: 2023-08-10 | End: 2023-08-17

## 2023-08-10 RX ORDER — IBUPROFEN 600 MG/1
600 TABLET ORAL ONCE
Status: COMPLETED | OUTPATIENT
Start: 2023-08-10 | End: 2023-08-10

## 2023-08-10 RX ADMIN — IBUPROFEN 600 MG: 600 TABLET, FILM COATED ORAL at 11:28

## 2023-08-10 RX ADMIN — SULFAMETHOXAZOLE AND TRIMETHOPRIM 1 TABLET: 800; 160 TABLET ORAL at 11:28

## 2023-08-10 RX ADMIN — LORAZEPAM 2 MG: 1 TABLET ORAL at 11:27

## 2023-08-10 ASSESSMENT — PAIN - FUNCTIONAL ASSESSMENT: PAIN_FUNCTIONAL_ASSESSMENT: 0-10

## 2023-08-10 ASSESSMENT — ENCOUNTER SYMPTOMS
VOICE CHANGE: 0
VOMITING: 0
DIARRHEA: 0
TROUBLE SWALLOWING: 0
ROS SKIN COMMENTS: ABSCESS
SHORTNESS OF BREATH: 0
NAUSEA: 0

## 2023-08-10 ASSESSMENT — PAIN DESCRIPTION - PAIN TYPE: TYPE: ACUTE PAIN

## 2023-08-10 ASSESSMENT — PAIN DESCRIPTION - FREQUENCY: FREQUENCY: CONTINUOUS

## 2023-08-10 ASSESSMENT — PAIN SCALES - GENERAL: PAINLEVEL_OUTOF10: 5

## 2023-08-10 NOTE — ED NOTES
Reviewed patient discharge instructions at this time, copy given to patient. No questions or concerns. Patient voiced understanding.         Evelyn Talavera RN  08/10/23 7947

## 2023-08-10 NOTE — ED PROVIDER NOTES
[facility]  eMERGENCY dEPARTMENT eNCOUnter      Pt Name: Timothy Greenwood  MRN: [id number]  [address] [facility] [facility] [address] 1995  Date of evaluation: 8/10/2023  Provider: Shimon Dillon MD    [address] [place] [address]       Chief Complaint   Patient presents with    Abscess     Abscess under right arm x1 week. HISTORY OF PRESENT ILLNESS   (Location/Symptom, Timing/Onset, Context/Setting, Quality, Duration, Modifying Factors, Severity)  Note limiting factors. History obtained from: the patient     Timothy Greenwood is a 28 y.o. female who presents with an abscess under her right armpit for 1 week. Patient is a fever pus drainage. Reports pain is moderate constant and worsening. Reports tactile pressure worsens the pain nothing improves it. HPI    Nursing Notes were reviewed. REVIEW OFSYSTEMS    (2-9 systems for level 4, 10 or more for level 5)     Review of Systems   Constitutional:  Negative for appetite change and fever. HENT:  Negative for trouble swallowing and voice change. Eyes:  Negative for visual disturbance. Respiratory:  Negative for shortness of breath. Cardiovascular:  Negative for chest pain and palpitations. Gastrointestinal:  Negative for diarrhea, nausea and vomiting. Genitourinary:  Negative for dysuria. Musculoskeletal:  Negative for gait problem. Skin:         Abscess   Neurological:  Negative for seizures and syncope. Psychiatric/Behavioral:  Negative for self-injury and suicidal ideas. Except as noted above the remainder of the review of systems was reviewed and negative. PAST MEDICAL HISTORY   History reviewed. No pertinent past medical history. SURGICAL HISTORY       Past Surgical History:   Procedure Laterality Date     SECTION           CURRENT MEDICATIONS       Previous Medications    No medications on file       ALLERGIES     Ultram [tramadol] and Vicodin [hydrocodone-acetaminophen]    FAMILY HISTORY     History reviewed.  No